# Patient Record
(demographics unavailable — no encounter records)

---

## 2024-10-30 NOTE — HISTORY OF PRESENT ILLNESS
[FreeTextEntry1] :     This HPI and reflects a summary and review of records : including previous and most recent  Labs, body imaging, consults and progress notes, operative and pathology reports, EKG reports, ED records, found in [facility], [email or web address],  [employer] and any additional records brought in by  the patient at the time of the visit.   PCP: Zuleyka  86yo M w h/o PVD, HLD, BPH, OA GERD w LPR   2/28/24 MBS: referred  by Dr. Chapo Bates due to reported solid food dysphagia and increased phlegm/mucus. Pt had direct laryngoscopy on  01/19/24 which revealed: mild post glottic edema and right vocal cord paresis. Pt states  increased mucus in his throat and intermittent (approximately 1x every 1-2 weeks) sensation of food "not going down", resulting in coughing/spitting up the bolus. Degenerative spinal/esophageal changes (observed at approximately C4-5, C5-6, C6-7) with narrowing of the lumen was consistently noted, though this did not significantly impact bolus passage through the cervical esophagus. This could be contributing to pt's sensation of globus. An informal esophageal scan in the AP plane revealed adequate bolus passage through the distal esophagus and good clearance of the pyriforms bilaterally, per attending radiologist.   lateral view the cervical spine reveals advanced spondylosis deformans with large anterior bridging osteophytes and intervertebral disc space narrowing more prominent from C4-5 through C6-7 levels.  Smooth indentation along the posterior esophageal wall from anterior bridging osteophytes. A small transient coexisting cricopharyngeal bar may be present at C4-5. There was a transient smooth indentation along the anterior cervical esophagus at C4-5 which could represent an esophageal web.   4/2/24 Dr Bates: TNE--> Schatzki Ring, bx w + Esoguard 4/24/24 Swallow Therapy: Intermittent choking, lump in the throat & feeling like food wont go down, when this happens , he cant drink & will gag it up dysphagia when begins to eat, food gets stuck, waits, spits up food, will then be able to eat, spits up phlegm Few yrs of hoarseness/raspiness 5/28/24 PET/CT: FDG avid bilateral cervical, subpectoral, axillary, and iliac chain nodes, probably representing lymphoma or a systemic infectious/inflammatory process. Biopsy is recommended. 6/7/24 L. axillary LN: low grade B-cell Lymphoma  9/20/24    Today:  no c/o , CP, SOB/ LYON, Cough, Wheeze, Palpitations, edema  6/2/24:  Pt c/o intermittent dysphagia to solids only              Has noted w dry cereal and Kale              States that he swallows , then feels a choking sensation in his neck              At this point, fluids will not help, so he induces  vomiting & spits up the food and phlegm              No anorexia, wt loss              Occas ht burn, + throat clearing and horaseness 7/23/24 Esophagram:  An anterior filling defect at the level of C4-5 suggests the possibility of an esophageal web.                                     No hiatus hernia or gastroesophageal reflux.                                     Mild intermittent tertiary contractions in lower esophagus.  8/2/24 EGD: gastritis, +HP; Esophagitis A-, + Barretts; 2+ tortuosity of esophagus, No web seen   8/20/24 :  occas intermittent dysphagia to solids only                 occas when he swallows , then feels a choking sensation in his nec                 Occas throat clearing/hoarseness                 Rxed for H.Pylori --> w doxy, metronidazole, pepto, omep x 10 days                     11/18/24 Today:  intermittent dysphagia to solids only-->                            when he swallows , then feels a choking sensation in his neck-->                             Ht burn--> No                            Throat clearing -->                             Hoarseness-->     * Abd pain-->no * Nausea--> no * Vomit--> no * Early satiety--> no * Belching--> no * Hiccups--> no * Regurgitation--> no * Acid Taste / Water Brash--> no * Post-Nasal Drip--> no * Congestion--> no * Globus--> no * Cough--> no * Wheeze / PC-> -no * BMs: # 4 qd * Constipation--> no * Diarrhea--> no * Bloating--> no * Strain on Defecation--> no * Incompl Evac--> no * Flatulence--> no * Gurgling--> no * Melena--> no * BPBPR-> -no * Anorexia--> no * Wt. Loss--> no

## 2024-10-30 NOTE — ADDENDUM
[FreeTextEntry1] : I spent a total of    minutes with this patient encounter . Greater than 50% of the time spent was     devoted to counseling and coordinating care including review of records, pertinent labs     data and studies, as well as discussing diagnosis evaluation and workup, planned     therapeutic interventions and future disposition of care. This includes any additional     research needed to obtain further information in formulating the plan of care of     this patient. This includes counseling the patient about their disease and diagnosis.

## 2024-10-30 NOTE — ASSESSMENT
[FreeTextEntry1] :   1.  Dysphagia--intermittent to solids   assoc w throat clearing, globus, hoarseness   1/19/24 direct laryngoscopy which revealed: mild post glottic edema and right vocal cord paresis.    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  Clinically appears to happen at  or just after the initiation of swallowing  Probably a combination of edema from LPR  Degenerative spinal/esophageal changes (observed at  ~ C4-5, C5-6, C6-7) w some narrowing of the lumen 7/23/24 Esophagram:  Mild intermittent tertiary contractions in lower esophagus.  8/2/24 EGD: gastritis, +HP; Esophagitis A-, + Barretts; 2+ tortuosity of esophagus, No web seen                        No EE or stricture/Ring noted + Barretts  Recommend:  Omep 40mg qd * Anti-reflux diet & life-style changes reviewed & re-emphasized.   * HOB elevation /  Bedge use emphasized * Weight reduction & regular exercise emphasized * No  need for pH Monitor,  Manometry *  ++  need for ENT  eval/F/U,  * No  need for  Surgical  eval   repeat EGD in 3 yrs      2. Gastritis :   No N, V, early satiety, pain *  ++ H.Pylori,   + IM,    Bile;    No NSAID  or  ETOH exposure-- was found Recommended and reviewed:  * Avoid NSAIDs / ETOH--have been shown to exacerbate and possible lead to cx's & GIBs * s/p  Rx Pylera, x 10 days  *  s/p  PPI : Omep 20mg BID x 60 days *  for H.Pylori stool Ag

## 2024-11-18 NOTE — ADDENDUM
[FreeTextEntry1] : I spent a total of 30 minutes with this patient encounter . Greater than 50% of the time spent was     devoted to counseling and coordinating care including review of records, pertinent labs     data and studies, as well as discussing diagnosis evaluation and workup, planned     therapeutic interventions and future disposition of care. This includes any additional     research needed to obtain further information in formulating the plan of care of     this patient. This includes counseling the patient about their disease and diagnosis.

## 2024-11-18 NOTE — HISTORY OF PRESENT ILLNESS
[FreeTextEntry1] :     This HPI and reflects a summary and review of records : including previous and most recent  Labs, body imaging, consults and progress notes, operative and pathology reports, EKG reports, ED records, found in [facility], [employer],  [facility] and any additional records brought in by  the patient at the time of the visit.   PCP: Zuleyka  86yo M w h/o PVD, HLD, BPH, OA GERD w LPR   2/28/24 MBS: referred  by Dr. Chapo Bates due to reported solid food dysphagia and increased phlegm/mucus. Pt had direct laryngoscopy on  01/19/24 which revealed: mild post glottic edema and right vocal cord paresis. Pt states  increased mucus in his throat and intermittent (approximately 1x every 1-2 weeks) sensation of food "not going down", resulting in coughing/spitting up the bolus. Degenerative spinal/esophageal changes (observed at approximately C4-5, C5-6, C6-7) with narrowing of the lumen was consistently noted, though this did not significantly impact bolus passage through the cervical esophagus. This could be contributing to pt's sensation of globus. An informal esophageal scan in the AP plane revealed adequate bolus passage through the distal esophagus and good clearance of the pyriforms bilaterally, per attending radiologist.   lateral view the cervical spine reveals advanced spondylosis deformans with large anterior bridging osteophytes and intervertebral disc space narrowing more prominent from C4-5 through C6-7 levels.  Smooth indentation along the posterior esophageal wall from anterior bridging osteophytes. A small transient coexisting cricopharyngeal bar may be present at C4-5. There was a transient smooth indentation along the anterior cervical esophagus at C4-5 which could represent an esophageal web.   4/2/24 Dr Bates: TNE--> Schatzki Ring, bx w + Esoguard 4/24/24 Swallow Therapy: Intermittent choking, lump in the throat & feeling like food wont go down, when this happens , he cant drink & will gag it up dysphagia when begins to eat, food gets stuck, waits, spits up food, will then be able to eat, spits up phlegm Few yrs of hoarseness/raspiness 5/28/24 PET/CT: FDG avid bilateral cervical, subpectoral, axillary, and iliac chain nodes, probably representing lymphoma or a systemic infectious/inflammatory process. Biopsy is recommended. 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                                     No hiatus hernia or gastroesophageal reflux.                                     Mild intermittent tertiary contractions in lower esophagus.  8/2/24 EGD: gastritis, +HP; Esophagitis A-, + Barretts; 2+ tortuosity of esophagus, No web seen   8/20/24 :  occas intermittent dysphagia to solids only                 occas when he swallows , then feels a choking sensation in his neck                 Occas throat clearing/hoarseness                 Rxed for H.Pylori --> w doxy, metronidazole, pepto, omep x 10 days                     11/18/24 Today:  intermittent dysphagia to solids only--> usually Kale/ dry cereal                            when he swallows , then feels a choking sensation in his neck--> yes                            Ht burn--> No                            Throat clearing --> sometimes                             Hoarseness--> yes    * Abd pain-->no * Nausea--> no * Vomit--> no * Early satiety--> no * Belching--> no * Hiccups--> no * Regurgitation--> no * Acid Taste / Water Brash--> no * Post-Nasal Drip--> no * Congestion--> no * Globus--> no * Cough--> no * Wheeze / PC-> -no * BMs: # 4 qd * Constipation--> no * Diarrhea--> no * Bloating--> no * Strain on Defecation--> no * Incompl Evac--> no * Flatulence--> no * Gurgling--> no * Melena--> no * BPBPR-> -no * Anorexia--> no * Wt. Loss--> no